# Patient Record
Sex: FEMALE | Race: WHITE | NOT HISPANIC OR LATINO
[De-identification: names, ages, dates, MRNs, and addresses within clinical notes are randomized per-mention and may not be internally consistent; named-entity substitution may affect disease eponyms.]

---

## 2017-03-30 ENCOUNTER — RECORDS - HEALTHEAST (OUTPATIENT)
Dept: ADMINISTRATIVE | Facility: OTHER | Age: 37
End: 2017-03-30
Payer: COMMERCIAL

## 2017-08-01 ENCOUNTER — OFFICE VISIT - HEALTHEAST (OUTPATIENT)
Dept: FAMILY MEDICINE | Facility: CLINIC | Age: 37
End: 2017-08-01

## 2017-08-01 DIAGNOSIS — J32.9 SINUSITIS: ICD-10-CM

## 2017-08-14 ENCOUNTER — COMMUNICATION - HEALTHEAST (OUTPATIENT)
Dept: FAMILY MEDICINE | Facility: CLINIC | Age: 37
End: 2017-08-14

## 2017-08-14 ENCOUNTER — AMBULATORY - HEALTHEAST (OUTPATIENT)
Dept: FAMILY MEDICINE | Facility: CLINIC | Age: 37
End: 2017-08-14

## 2017-08-14 DIAGNOSIS — J32.9 SINUSITIS: ICD-10-CM

## 2017-10-25 ENCOUNTER — OFFICE VISIT - HEALTHEAST (OUTPATIENT)
Dept: PODIATRY | Facility: CLINIC | Age: 37
End: 2017-10-25

## 2017-10-25 DIAGNOSIS — M72.2 PLANTAR FASCIITIS: ICD-10-CM

## 2017-10-26 ENCOUNTER — COMMUNICATION - HEALTHEAST (OUTPATIENT)
Dept: OTHER | Facility: CLINIC | Age: 37
End: 2017-10-26

## 2017-10-31 ENCOUNTER — COMMUNICATION - HEALTHEAST (OUTPATIENT)
Dept: OTHER | Facility: CLINIC | Age: 37
End: 2017-10-31

## 2017-11-05 ENCOUNTER — COMMUNICATION - HEALTHEAST (OUTPATIENT)
Dept: SCHEDULING | Facility: CLINIC | Age: 37
End: 2017-11-05

## 2017-11-07 ENCOUNTER — COMMUNICATION - HEALTHEAST (OUTPATIENT)
Dept: ADMINISTRATIVE | Facility: CLINIC | Age: 37
End: 2017-11-07

## 2017-11-16 ENCOUNTER — RECORDS - HEALTHEAST (OUTPATIENT)
Dept: ADMINISTRATIVE | Facility: OTHER | Age: 37
End: 2017-11-16

## 2017-11-16 ENCOUNTER — AMBULATORY - HEALTHEAST (OUTPATIENT)
Dept: PODIATRY | Facility: CLINIC | Age: 37
End: 2017-11-16

## 2017-11-16 DIAGNOSIS — L60.0 INGROWN TOENAIL: ICD-10-CM

## 2017-12-09 ENCOUNTER — COMMUNICATION - HEALTHEAST (OUTPATIENT)
Dept: FAMILY MEDICINE | Facility: CLINIC | Age: 37
End: 2017-12-09

## 2018-01-28 ENCOUNTER — COMMUNICATION - HEALTHEAST (OUTPATIENT)
Dept: FAMILY MEDICINE | Facility: CLINIC | Age: 38
End: 2018-01-28

## 2018-01-28 DIAGNOSIS — N94.6 DYSMENORRHEA: ICD-10-CM

## 2018-03-14 ENCOUNTER — OFFICE VISIT - HEALTHEAST (OUTPATIENT)
Dept: FAMILY MEDICINE | Facility: CLINIC | Age: 38
End: 2018-03-14

## 2018-03-14 DIAGNOSIS — E66.9 OBESITY (BMI 30-39.9): ICD-10-CM

## 2018-03-14 DIAGNOSIS — M72.2 PLANTAR FASCIITIS, RIGHT: ICD-10-CM

## 2018-03-14 DIAGNOSIS — N94.6 DYSMENORRHEA: ICD-10-CM

## 2018-03-14 DIAGNOSIS — J98.8 WHEEZING-ASSOCIATED RESPIRATORY INFECTION: ICD-10-CM

## 2018-03-14 DIAGNOSIS — J30.89 ENVIRONMENTAL AND SEASONAL ALLERGIES: ICD-10-CM

## 2018-03-14 DIAGNOSIS — Z00.00 HEALTH CARE MAINTENANCE: ICD-10-CM

## 2018-03-14 DIAGNOSIS — G43.829 MENSTRUAL MIGRAINE: ICD-10-CM

## 2018-03-14 ASSESSMENT — MIFFLIN-ST. JEOR: SCORE: 1556.24

## 2018-03-28 ENCOUNTER — OFFICE VISIT - HEALTHEAST (OUTPATIENT)
Dept: PHYSICAL THERAPY | Facility: REHABILITATION | Age: 38
End: 2018-03-28

## 2018-03-28 DIAGNOSIS — M72.2 PLANTAR FASCIITIS: ICD-10-CM

## 2018-03-28 DIAGNOSIS — M79.671 ARCH PAIN OF RIGHT FOOT: ICD-10-CM

## 2018-04-04 ENCOUNTER — OFFICE VISIT - HEALTHEAST (OUTPATIENT)
Dept: PHYSICAL THERAPY | Facility: REHABILITATION | Age: 38
End: 2018-04-04

## 2018-04-04 DIAGNOSIS — M72.2 PLANTAR FASCIITIS: ICD-10-CM

## 2018-04-04 DIAGNOSIS — M79.671 ARCH PAIN OF RIGHT FOOT: ICD-10-CM

## 2018-04-11 ENCOUNTER — OFFICE VISIT - HEALTHEAST (OUTPATIENT)
Dept: PHYSICAL THERAPY | Facility: REHABILITATION | Age: 38
End: 2018-04-11

## 2018-04-11 DIAGNOSIS — M79.671 ARCH PAIN OF RIGHT FOOT: ICD-10-CM

## 2018-04-11 DIAGNOSIS — M72.2 PLANTAR FASCIITIS: ICD-10-CM

## 2018-04-25 ENCOUNTER — OFFICE VISIT - HEALTHEAST (OUTPATIENT)
Dept: PHYSICAL THERAPY | Facility: REHABILITATION | Age: 38
End: 2018-04-25

## 2018-04-25 DIAGNOSIS — M79.671 ARCH PAIN OF RIGHT FOOT: ICD-10-CM

## 2018-04-25 DIAGNOSIS — M72.2 PLANTAR FASCIITIS: ICD-10-CM

## 2018-05-02 ENCOUNTER — OFFICE VISIT - HEALTHEAST (OUTPATIENT)
Dept: PHYSICAL THERAPY | Facility: REHABILITATION | Age: 38
End: 2018-05-02

## 2018-05-02 DIAGNOSIS — M79.671 ARCH PAIN OF RIGHT FOOT: ICD-10-CM

## 2018-05-02 DIAGNOSIS — M72.2 PLANTAR FASCIITIS: ICD-10-CM

## 2018-09-24 ENCOUNTER — OFFICE VISIT - HEALTHEAST (OUTPATIENT)
Dept: FAMILY MEDICINE | Facility: CLINIC | Age: 38
End: 2018-09-24

## 2018-09-24 DIAGNOSIS — J06.9 VIRAL URI WITH COUGH: ICD-10-CM

## 2018-09-24 DIAGNOSIS — R06.2 WHEEZING: ICD-10-CM

## 2018-09-24 DIAGNOSIS — J30.89 ENVIRONMENTAL AND SEASONAL ALLERGIES: ICD-10-CM

## 2018-09-24 DIAGNOSIS — J34.89 SINUS PRESSURE: ICD-10-CM

## 2018-09-27 ENCOUNTER — COMMUNICATION - HEALTHEAST (OUTPATIENT)
Dept: FAMILY MEDICINE | Facility: CLINIC | Age: 38
End: 2018-09-27

## 2019-01-04 ENCOUNTER — OFFICE VISIT - HEALTHEAST (OUTPATIENT)
Dept: FAMILY MEDICINE | Facility: CLINIC | Age: 39
End: 2019-01-04

## 2019-01-04 DIAGNOSIS — L25.9 CONTACT DERMATITIS AND ECZEMA: ICD-10-CM

## 2019-03-11 ENCOUNTER — COMMUNICATION - HEALTHEAST (OUTPATIENT)
Dept: FAMILY MEDICINE | Facility: CLINIC | Age: 39
End: 2019-03-11

## 2019-03-20 ENCOUNTER — COMMUNICATION - HEALTHEAST (OUTPATIENT)
Dept: FAMILY MEDICINE | Facility: CLINIC | Age: 39
End: 2019-03-20

## 2019-03-20 DIAGNOSIS — N94.6 DYSMENORRHEA: ICD-10-CM

## 2019-11-08 ENCOUNTER — COMMUNICATION - HEALTHEAST (OUTPATIENT)
Dept: FAMILY MEDICINE | Facility: CLINIC | Age: 39
End: 2019-11-08

## 2019-11-08 DIAGNOSIS — N94.6 DYSMENORRHEA: ICD-10-CM

## 2020-10-15 ENCOUNTER — RECORDS - HEALTHEAST (OUTPATIENT)
Dept: ADMINISTRATIVE | Facility: OTHER | Age: 40
End: 2020-10-15

## 2021-05-25 ENCOUNTER — RECORDS - HEALTHEAST (OUTPATIENT)
Dept: ADMINISTRATIVE | Facility: CLINIC | Age: 41
End: 2021-05-25

## 2021-05-29 ENCOUNTER — RECORDS - HEALTHEAST (OUTPATIENT)
Dept: ADMINISTRATIVE | Facility: CLINIC | Age: 41
End: 2021-05-29

## 2021-05-30 ENCOUNTER — RECORDS - HEALTHEAST (OUTPATIENT)
Dept: ADMINISTRATIVE | Facility: CLINIC | Age: 41
End: 2021-05-30

## 2021-05-31 VITALS — WEIGHT: 186.6 LBS | BODY MASS INDEX: 32.03 KG/M2

## 2021-06-01 VITALS — BODY MASS INDEX: 32.52 KG/M2 | WEIGHT: 195.19 LBS | HEIGHT: 65 IN

## 2021-06-02 ENCOUNTER — RECORDS - HEALTHEAST (OUTPATIENT)
Dept: ADMINISTRATIVE | Facility: CLINIC | Age: 41
End: 2021-06-02

## 2021-06-02 VITALS — WEIGHT: 195 LBS | BODY MASS INDEX: 32.45 KG/M2

## 2021-06-02 VITALS — WEIGHT: 197.7 LBS | BODY MASS INDEX: 32.9 KG/M2

## 2021-06-03 ENCOUNTER — RECORDS - HEALTHEAST (OUTPATIENT)
Dept: ADMINISTRATIVE | Facility: CLINIC | Age: 41
End: 2021-06-03

## 2021-06-03 NOTE — TELEPHONE ENCOUNTER
Refill Approved    Rx renewed per Medication Renewal Policy. Medication was last renewed on 3/21/19.    Zarina Bansal, [facility] Triage/Med Refill 11/8/2019     Requested Prescriptions   Pending Prescriptions Disp Refills     norgestimate-ethinyl estradiol (SPRINTEC, 28,) 0.25-35 mg-mcg per tablet 90 tablet 2     Sig: TAKE 1 TABLET BY MOUTH DAILY (TAKES CONTINUOUSLY)       Oral Contraceptives Protocol Passed - 11/8/2019  8:16 AM        Passed - Visit with PCP or prescribing provider visit in last 12 months      Last office visit with prescriber/PCP: 1/4/2019 Natalya Beasley MD OR same dept: 1/4/2019 Natalya Beasley MD OR same specialty: 1/4/2019 Natalya Beasley MD  Last physical: Visit date not found Last MTM visit: Visit date not found   Next visit within 3 mo: Visit date not found  Next physical within 3 mo: Visit date not found  Prescriber OR PCP: Natalya Beasley MD  Last diagnosis associated with med order: 1. Dysmenorrhea  - norgestimate-ethinyl estradiol (SPRINTEC, 28,) 0.25-35 mg-mcg per tablet; TAKE 1 TABLET BY MOUTH DAILY (TAKES CONTINUOUSLY)  Dispense: 90 tablet; Refill: 2    If protocol passes may refill for 12 months if within 3 months of last provider visit (or a total of 15 months).

## 2021-06-12 NOTE — PROGRESS NOTES
DIAGNOSIS:  1. Sinusitis  azithromycin (ZITHROMAX Z-YOHAN) 250 MG tablet       PLAN:  Patient Instructions   Increase flonase to 2 sprays once daily in each nostril    zithromax 250 mg.   Two on day #1 and then one daily for 4 days.      HPI: onset watery eyes about a week ago.  Cough onset about4 days ago.  Some post-tussive vomiting.  Last evening sinus pressure started b/l, frontal headache. Some greenish drainage.  Some sob and increased use of inhaler.  No fever.  Taking flonase one spray daily.  Last used albuterol last night.             Current Outpatient Prescriptions on File Prior to Visit   Medication Sig Dispense Refill     acetaminophen (TYLENOL) 500 MG tablet Take 1,000 mg by mouth every 6 (six) hours as needed for pain.       albuterol (PROVENTIL HFA;VENTOLIN HFA) 90 mcg/actuation inhaler Inhale 2 puffs every 6 (six) hours as needed for wheezing. 18 g 1     ibuprofen (ADVIL,MOTRIN) 200 MG tablet Take 600 mg by mouth every 6 (six) hours as needed for pain.       loratadine (CLARITIN) 10 mg tablet Take 10 mg by mouth daily.       MYZILRA 50-30 (6)/75-40 (5)/125-30(10) per tablet TAKE 1 TABLET BY MOUTH DAILY. 84 tablet 0     norgestimate-ethinyl estradiol (MONONESSA, 28,) 0.25-35 mg-mcg per tablet Take 1 tablet by mouth daily. 3 Package 4     No current facility-administered medications on file prior to visit.        Pmh: reviewed  Psh: reviewed  Allergy:  reviewed      EXAM:    /60 (Patient Site: Right Arm, Patient Position: Sitting, Cuff Size: Adult Large)  Pulse 80  Temp 97.9  F (36.6  C) (Oral)   Resp 20  Wt 186 lb 9.6 oz (84.6 kg)  SpO2 99%  BMI 32.03 kg/m2  GEN:   ALERT, NAD, ORIENTED TIMES THREE  HEENT: TMS NL, PERRL, THR CLEAR. Diffuse b/l frontal and maxillary sinus tx to percussion  NECK: SUPPLE WITHOUT ADENOPATHY OR THYROMEGALY  LUNGS: CTA  COR: RRR WITHOUT MURMUR  SKIN: NO RASH    No results found for this or any previous visit (from the past 168 hour(s)).

## 2021-06-13 NOTE — PROGRESS NOTES
Subjective: The patient presented to the clinic today complaining of pain in the bottom the right heel.  The patient indicated that she has had this heel pain for approximately 4 months.  She describes it as an aching pain.  He stated that the pain is worse in the morning when she first gets out of bed.  Prolonged weightbearing and ambulation also aggravate her right heel.  The patient also complained of a painful ingrown toenail involving the inside of her left great toe.    Objective findings: Nails bilateral feet are normal length and color.  The medial border of the left great toenail is severely incurvated.  There is pain on palpation along the medial margin of the left great toenail.  Skin bilaterally warm supple and intact.  DP PT pulses +2/4 bilateral feet.  Capillary refill less than 2 seconds bilateral feet.  Negative clonus, negative Babinski bilateral feet.  Range of motion within normal limits bilateral feet.  Muscle power +5/5 bilaterally in all compartments.  There is severe pain on palpation of the plantar medial aspect of the right heel.    Assessment: Plantar fasciitis right foot, onychocryptosis left great toe    Plan: The patient was given a cortisone injection in the right heel today consisting of 2 cc of dexamethasone sodium phosphate and 1 cc of 2% lidocaine plain.  I have also recommended orthotics.  The patient is currently taking over-the-counter anti-inflammatory medication.

## 2021-06-14 NOTE — PROGRESS NOTES
Subjective findings: The patient return to the clinic today for partial nail excision and matricectomy of the medial border of the left great toenail.  She was previously diagnosed with onychocryptosis.    Description: The patient was taken to the treatment room and placed on the treatment table.  Under local anesthesia of 2% lidocaine plain the left foot was prepped and draped in usual manner.  Following the application of a digital tourniquet to the left great toe the following procedure was performed.  Attention was directed to the medial border of the left great toenail where utilizing an English anvil nail splitter the nail was split from distal to proximal to the level of the epionychium.  The medial border of the left great toenail was then removed.  Next, 3, 30 second applications of phenol were applied to the matrix.  The wound was then flushed with copious amounts of alcohol.  A sterile dressing was applied comprising of bacitracin, Telfa, 2 inch Lucinda and tube gauze.  The tourniquet was removed and normal color return to the left great toe.  The patient appeared to tolerate the procedure and anesthesia well and was discharged in good condition.  She was given both written and verbal postoperative instructions.

## 2021-06-16 PROBLEM — E66.9 OBESITY (BMI 30-39.9): Status: ACTIVE | Noted: 2018-03-14

## 2021-06-16 NOTE — PROGRESS NOTES
Assessment/Plan:    1. Health care maintenance  Immunizations reviewed and utd--had seasonal flu shot at work  Pap reviewed normal with negative HPV 2016, repeat 2021 based on risk factors.  Routine Dental and Eye care recommended  Discussed importance of regular exercise and appropriate calcium intake  Patient defers labs that she is nonfasting.  Plan to recheck lipids and glucose in a year.  Would also consider thyroid given family history and weight issues.  BMI reviewed with patient today consistent with obesity.  Discussed ways to increase activity/more regular exercise.    2. Dysmenorrhea  Nicely regulated with oral contraceptive.  Patient will continue that for now--she is considering discontinuation once her  has vasectomy later this year.  - norgestimate-ethinyl estradiol (SPRINTEC, 28,) 0.25-35 mg-mcg per tablet; TAKE 1 TABLET BY MOUTH DAILY (TAKES CONTINUOUSLY)  Dispense: 84 tablet; Refill: 3    3. Wheezing-associated respiratory infection  Okay refill of inhaler for as needed use with URI.  - albuterol (PROAIR HFA;PROVENTIL HFA;VENTOLIN HFA) 90 mcg/actuation inhaler; Inhale 2 puffs every 6 (six) hours as needed for wheezing.  Dispense: 18 g; Refill: 1    4. Menstrual migraine  Regulated with NSAID and hydration.  Patient will monitor and follow-up with any changes especially if notes issues off oral contraceptive.    5. Environmental and seasonal allergies  Use of Zyrtec daily.    6. Plantar fasciitis, right  Ongoing issues despite use of heel cups, consult with podiatry and injection.  Patient interested in pursuing further conservative measures and will be sent to physical therapy.  - Ambulatory referral to Physical Therapy    Pt states an understanding and agrees with the above plan.    The following high BMI interventions were performed this visit: encouragement to exercise and weight monitoring            Health Maintenance   Topic Date Due     INFLUENZA VACCINE RULE BASED (1) 08/01/2017      "ADVANCE DIRECTIVES DISCUSSED WITH PATIENT  06/27/2021     PAP SMEAR  11/28/2021     TD 18+ HE  08/13/2022     TDAP ADULT ONE TIME DOSE  Completed         HPI    Patient is a 38 y.o. female presents for a physical exam.  Patient is not fasting.  Last cholesterol and glucose couple years ago.  She did have gestational diabetes so does need intermittent screening for diabetes.  Also noted his 20 pound weight gain over the last couple years.  Patient attributes this to significant decrease in activity related to job/family demands as well as, more recently, issues with exercise rotations related to heel pain.  Patient is seen podiatry in been diagnosed with plantar fasciitis.  She is using heel cups in her shoes and is attempted to do some stretching without significant relief.  We did do a trial of injection which she states \"only helped for a few days\".  Symptoms are only with the right heel but is been present now for several months.  She is consistent with wearing shoes but has significant pain when she is barefoot and when she first gets up in the morning.  Arleen has a history of allergies and some wheezing when she gets upper respiratory infections.  She is consistent with using her daily Zyrtec but is requesting refill of her albuterol inhaler which she uses on occasion when she is sick.  Patient remains sexually active.  She is on oral contraceptive for birth control and regulation of her cycles.  This is working well and she would like a refill.  She is planning to discontinue birth control and her  gets a vasectomy this year.  She denies any dyspareunia, discharge, spotting between cycles, or other concerns.  She reports normal bowel and bladder function.  Patient has had history of type I herpes simplex.  No recent outbreaks.  She states her  will periodically get outbreaks under stress but she does not.    The following portions of the patient's history were reviewed and updated as " appropriate: allergies, current medications, past family history, past medical history, past social history, past surgical history and problem list.    Review of Systems  Pertinent items are noted in HPI.  A 12 point comprehensive review of systems was negative except as noted.    Immunization History   Administered Date(s) Administered     DT (pediatric) 01/01/1996, 01/01/1998     DTP 1981, 1981, 1981, 08/23/1982, 06/04/1986     Hep A, historic 11/16/2007, 06/17/2008     Hep B, historic 07/28/1995, 09/01/1995, 02/06/1996     IPV 1981, 1981, 1981, 08/23/1982, 06/04/1986     Influenza, Seasonal, Inj PF IIV3 11/07/2012     Influenza, inj, historic,unspecified 11/20/2017     Influenza, seasonal,quad inj 6-35 mos 11/02/2011     MMR 05/21/1982, 01/01/1993     Meningococcal MCV4P 06/01/2001     Td,adult,historic,unspecified 06/14/2007     Tdap 08/13/2012     No results found for this or any previous visit (from the past 240 hour(s)).    Patient Active Problem List   Diagnosis     Menstrual migraine     Dysmenorrhea     Herpes Simplex Type I     History of gestational diabetes     Environmental and seasonal allergies     Family History   Problem Relation Age of Onset     Diabetes Maternal Grandfather      Hyperlipidemia Paternal Grandfather      Hypertension Mother      Cancer Mother      breast cancer     Hypothyroidism Mother      Hypertension Father      Depression Father      Asthma Father      Depression Sister      Dementia Maternal Grandmother      Social History     Social History     Marital status:      Spouse name: N/A     Number of children: N/A     Years of education: N/A     Occupational History     Not on file.     Social History Main Topics     Smoking status: Never Smoker     Smokeless tobacco: Never Used     Alcohol use No     Drug use: No     Sexual activity: Yes     Partners: Male     Birth control/ protection: OCP     Other Topics Concern     Not on file  "    Social History Narrative       Objective:    /66 (Patient Site: Right Arm, Patient Position: Sitting, Cuff Size: Adult Large)  Pulse 88  Temp 98.7  F (37.1  C) (Oral)   Resp 16  Ht 5' 5\" (1.651 m)  Wt 195 lb 3 oz (88.5 kg)  LMP 02/09/2018  BMI 32.48 kg/m2      General Appearance:    Alert, cooperative, no distress, appears stated age   Head:    Normocephalic, without obvious abnormality, atraumatic   Eyes:    PERRL, conjunctiva/corneas clear, EOM's intact both eyes   Ears:    Normal TM's and external ear canals, both ears   Nose:   Nares normal, septum midline, mucosa normal   Throat:   Lips, mucosa, and tongue normal; teeth and gums normal   Neck:   Supple, symmetrical, trachea midline, no adenopathy;     thyroid:  no enlargement/tenderness/nodules; no carotid    bruit or JVD   Back:     Symmetric, no curvature, no CVA tenderness   Lungs:     Clear to auscultation bilaterally, respirations unlabored   Chest Wall:    No tenderness or deformity    Heart:    Regular rate and rhythm, S1 and S2 normal, no murmur, rub   or gallop   Breast Exam:    No tenderness, masses, or nipple abnormality   Abdomen:     Soft, non-tender, bowel sounds active all four quadrants,     no masses, no organomegaly   Genitalia:   Normal external female genitalia with no evidence for irritation or discharge at the introitus.   Rectal:   Normal external rectal tissue.   Extremities:   Extremities normal, atraumatic, no cyanosis or edema   Pulses:   2+ and symmetric all extremities   Skin:   Skin color, texture, turgor normal, no rashes or lesions       Neurologic:   CNII-XII intact            "

## 2021-06-17 NOTE — PROGRESS NOTES
[facility] Daily Progress     Patient Name: Marta Luis  Date: 4/11/2018  Visit #: 3/12  Referral Diagnosis: Plantar fasciitis, right  Referring provider: Lesley Gonsales MD  Visit Diagnosis:     ICD-10-CM    1. Arch pain of right foot M79.671    2. Plantar fasciitis M72.2        Assessment:     Patient is a 38 y.o. female that presents with signs and symptoms consistent with R arch pain in foot secondary to plantar fasciitis and tightness of her RLE. Patient demonstrates impairments including decreased strength of everters and PFs with increased pain, and TTP of plantar fascia at origin near heel leading to impaired functional mobility. Patient's functional limitations include taking the first step in the morning, standing or walking for longer than 5 minutes, and walking barefoot without increased pain.    Today patient reports she was having a few good days and then her pain came back. She finds benefit from the tape and exercises, she may benefit from orthotics in the future.     HEP/POC compliance is  good .  Patient demonstrates understanding/independence with home program.  Patient is appropriate to continue with skilled physical therapy intervention, as indicated by initial plan of care.    Goal Status:  Pt. will be independent with home exercise program in : 4 weeks  Pt. will be able to walk : 10 minutes;on even surfaces;with no pain;with less difficulty;for household mobility;for community mobility;for work;for exercise/recreation;in 6 weeks  Patient will stand : 30 minutes;with no pain;with less difficultty;for work;in 6 weeks  Patient will return to: sport;exercise;work;for 1 hour;in 6 weeks  Patient will increase : LEFS score;for improved quality of function;for improved quality of life;in 6 weeks  Pt will: report minimal pain upon first step in the morning by 6 weeks.      Plan / Patient Education:     Continue with initial plan of care.  Progress with home program as  "tolerated.    Plan for next visit: review HEP, SL deadlifts, RLE balance, kinesiotape assessment, calf raises, glute strengthening    Subjective:     Pain Ratin  Overall feeling a little bit better, she is noticing she hasn't needed to take medication much this week. She does notice that when she doesn't stretch, she feels more pain. The tape last time didn't really stay, mostly because there was 3 strips.     Functional limitations are described as occurring with:   performing routine daily activities  standing for about 30 minutes   walking for about 10 minutes  putting any pressure on the foot    Objective:       Treatment Today       Patient Education: Patient was educated on continuing plan of care, progress and review of current HEP. Patient educated on importance of consistency with exercise and therapy, as well as activity modification in order to see change and improvements. Patient demonstrated and verbalized understanding.     Manual Therapy:  Ankle mobilizations to increase joint mobility, posterior glides for increased DF  Manual stretch to R plantar fascia     Exercises:  Exercise #1: arch raises in standing - hold 5 sec x 10  Comment #1: standing gastroc/soleus stretch - hold 30 sec x 2  Exercise #2: or seated gastroc/soleus stretch - hold 30 sec x 2  Comment #2: seated plantar fascia stretch - hold 10 sec x 5  Exercise #3: standing plantar fascia stretch at step - hold 20 sec x 3  Comment #3: seated hamstring stretch - hold 30 sec x 2  Exercise #4: ankle theraband 4 way      TREATMENT MINUTES COMMENTS   Evaluation     Self-care/ Home management     Manual therapy 20 Ankle mobilizations to increase joint mobility, posterior glides for increased DF  Manual stretch to R plantar fascia  kinesiotape to R ankle for stability and muscle imbalance - patient in supine with PF/INV: \"I\" strip origin medial malleolus around bottom of foot/calcaneus up along lateral malleolus to insert at TA muscle belly, " "patient in prone with foot in DF: \"I\" strip origin middle calcaneus along achilles tendon up gastroc belly and \"I to Y\" strip origin plantar met heads along achilles tendon with each \"Y\" strip following along soleus muscle belly; 50-75% stretch     Neuromuscular Re-education     Therapeutic Activity     Therapeutic Exercises 10 See above flowsheet; upright bike 4 min   Gait training     Modality__________________                Total 30    Blank areas are intentional and mean the treatment did not include these items.       Jennifer Francis, PT  4/11/2018    "

## 2021-06-17 NOTE — PROGRESS NOTES
[facility] Daily Progress     Patient Name: Marta Luis  Date: 4/25/2018  Visit #: 4/12  Referral Diagnosis: Plantar fasciitis, right  Referring provider: Lesley Gonsales MD  Visit Diagnosis:     ICD-10-CM    1. Arch pain of right foot M79.671    2. Plantar fasciitis M72.2        Assessment:     Patient is a 38 y.o. female that presents with signs and symptoms consistent with R arch pain in foot secondary to plantar fasciitis and tightness of her RLE. Patient demonstrates impairments including decreased strength of everters and PFs with increased pain, and TTP of plantar fascia at origin near heel leading to impaired functional mobility. Patient's functional limitations include taking the first step in the morning, standing or walking for longer than 5 minutes, and walking barefoot without increased pain.    Today patient reports she is feeling pretty good, decreased pain however she is sore from walking too much. She finds benefit from the stretches. Will trial one more session then independence.     HEP/POC compliance is  good .  Patient demonstrates understanding/independence with home program.  Patient is appropriate to continue with skilled physical therapy intervention, as indicated by initial plan of care.    Goal Status:  Pt. will be independent with home exercise program in : 4 weeks  Pt. will be able to walk : 10 minutes;on even surfaces;with no pain;with less difficulty;for household mobility;for community mobility;for work;for exercise/recreation;in 6 weeks  Patient will stand : 30 minutes;with no pain;with less difficultty;for work;in 6 weeks  Patient will return to: sport;exercise;work;for 1 hour;in 6 weeks  Patient will increase : LEFS score;for improved quality of function;for improved quality of life;in 6 weeks  Pt will: report minimal pain upon first step in the morning by 6 weeks.      Plan / Patient Education:     Continue with initial plan of care.  Progress with home program as  tolerated.    Plan for next visit: review HEP, SL deadlifts, RLE balance, kinesiotape assessment, calf raises, glute strengthening    Subjective:     Pain Rating: 3 this morning  Her ankle was starting to feel better and then [place] about 5 hours, it feels pretty sore. She had the pain to 1/10 with doing the stretches and doing the calf stretches.     Functional limitations are described as occurring with:   performing routine daily activities  standing for about 30 minutes   walking for about 10 minutes  putting any pressure on the foot    Objective:       Treatment Today       Patient Education: Patient was educated on continuing plan of care, progress and review of current HEP. Patient educated on importance of consistency with exercise and therapy, as well as activity modification in order to see change and improvements. Patient demonstrated and verbalized understanding.     Manual Therapy:  Ankle mobilizations to increase joint mobility, posterior glides for increased DF  Manual stretch to R plantar fascia     Exercises:  Exercise #1: arch raises in standing - hold 5 sec x 10  Comment #1: standing gastroc/soleus stretch - hold 30 sec x 2  Exercise #2: or seated gastroc/soleus stretch - hold 30 sec x 2  Comment #2: seated plantar fascia stretch - hold 10 sec x 5  Exercise #3: standing plantar fascia stretch at step - hold 20 sec x 3  Comment #3: seated hamstring stretch - hold 30 sec x 2  Exercise #4: ankle theraband 4 way  Comment #4: toe raises and calf raises with eccentric lowring at step - 10 reps       TREATMENT MINUTES COMMENTS   Evaluation     Self-care/ Home management     Manual therapy 10 Ankle mobilizations to increase joint mobility, posterior glides for increased DF  Manual stretch to R plantar fascia     Neuromuscular Re-education 10 SL balance on Airex   Therapeutic Activity     Therapeutic Exercises 10 See above flowsheet; upright bike 4 min   Gait  training     Modality__________________                Total 30    Blank areas are intentional and mean the treatment did not include these items.       Jennifer Francis, PT  4/25/2018

## 2021-06-17 NOTE — PROGRESS NOTES
[facility]   Foot/Ankle Initial Evaluation    Patient Name: Marta Luis  Date of evaluation: 3/28/2018  Referral Diagnosis: Plantar fasciitis, right  Referring provider: Lelsey Gonsales MD  Visit Diagnosis:     ICD-10-CM    1. Arch pain of right foot M79.671    2. Plantar fasciitis M72.2        Assessment:        Patient is a 38 y.o. female that presents with signs and symptoms consistent with R arch pain in foot secondary to plantar fasciitis and tightness of her RLE. Patient demonstrates impairments including decreased strength of everters and PFs with increased pain, and TTP of plantar fascia at origin near heel leading to impaired functional mobility. Patient's functional limitations include taking the first step in the morning, standing or walking for longer than 5 minutes, and walking barefoot without increased pain. Today patient responded well to manual therapy and kinesiotape application.    Patient educated on and demonstrated understanding of nature of impairment, plan of care, patient role and HEP. Patient compliant with PT and prognosis is good. Patient would benefit from skilled PT to progress and improve above impairments.    The POC is dynamic and will be modified on an ongoing basis.  Patient will return to clinic if symptoms persist.  Barriers to achieving goals as noted in the assessment section may affect outcome.  Prognosis to achieve goals is  good   Pt. is appropriate for skilled PT intervention as outlined in the Plan of Care (POC).  Pt. is a good candidate for skilled PT services to improve pain levels and function.    Goals:  Pt. will be independent with home exercise program in : 4 weeks  Pt. will be able to walk : 10 minutes;on even surfaces;with no pain;with less difficulty;for household mobility;for community mobility;for work;for exercise/recreation;in 6 weeks  Patient will stand : 30 minutes;with no pain;with less difficultty;for work;in 6 weeks  Patient will return  to: sport;exercise;work;for 1 hour;in 6 weeks  Patient will increase : LEFS score;for improved quality of function;for improved quality of life;in 6 weeks  Pt will: report minimal pain upon first step in the morning by 6 weeks.    Barriers to Learning or Achieving Goals:  No Barriers.  Chronicity of the problem.    Patient's expectations/goals are realistic.       Plan / Patient Instructions:        Plan of Care:   Communication with: Referral Source  Patient Related Instruction: Nature of Condition;Treatment plan and rationale;Self Care instruction;Basis of treatment;Body mechanics;Posture;Next steps;Expected outcome  Times per Week: 1-2  Number of Weeks: 6  Number of Visits: 12  Therapeutic Exercise: ROM;Stretching;Strengthening  Neuromuscular Reeducation: kinesio tape;posture;balance/proprioception;core;TNE  Manual Therapy: soft tissue mobilization;myofascial release;joint mobilization;muscle energy  Modalities: TENS;iontophoresis  Gait Training: as indicated    POC and pathology of condition were reviewed with patient.  Pt. is in agreement with the Plan of Care  A Home Exercise Program (HEP) was initiated today.  Pt. was instructed in exercises by PT and patient was given a handout with detailed instructions.    Plan for next visit: review HEP, eccentric control at step, RLE balance, kinesiotape assessment, calf raises, glute strengthening     Subjective:         History of Present Illness:    Marta is a 38 y.o. female who presents to therapy today with complaints of R heel pain. Date of onset is October 2017 and onset was gradual. Symptoms are intermittent and getting worse. Patient reports it is worse more in the mornings and at night after working. Hydrocortizone shot did not help, she has spent a ton of money on shoes and inserts and the pain is still there. She denies history of similar symptoms. She describes their previous level of function as not limited. She does have a history of previous R ankle  issues, she had played soccer on it and never got it fixed.     Pain Ratin  Pain rating at best: 3  Pain rating at worst: 8  Pain description: dull and with weight it is a throbbing pain    Functional limitations are described as occurring with:   performing routine daily activities  standing for about 30 minutes   walking for about 10 minutes  putting any pressure on the foot    Patient reports benefit from:  rest  , anti-inflammatory, elevation of the foot     Objective:      Note: Items left blank indicates the item was not performed or not indicated at the time of the evaluation.    Patient Outcome Measures :    Lower Extremity Functional Scale (_/80): 60   Scores range from 0-80, where a score of 80 represents maximum function. The minimal clinically important difference is a positive change of 9 points.    Ankle/Foot Examination  1. Arch pain of right foot     2. Plantar fasciitis       Involved Side: Right  Posture Observation:      General sitting posture is  normal.  Assistive Device: None  Gait Observation: no visible change  Hip Clearing: Does not provoke symptoms  Knee Clearing: Does not provoke symptoms    Foot/Ankle ROM:  WFL and no pain   Date: 3/28/2018     Ankle ROM( ) AROM in degrees AROM in degrees AROM in degrees    Right Left Right Left Right Left   Dorsiflexion, gastroc (10 )         Dorsiflexion, soleus (20 )         Plantar Flexion (50 )         Inversion (45-60 )         Eversion (15-30 )         Great Toe Extension (70 )         Great Toe Flexion (MTP45 , IP90 )          PROM in degrees PROM in degrees PROM in degrees    Right Left Right Left Right Left   Dorsiflexion, gastroc (10 )         Dorsiflexion, soleus (20 )         Plantar Flexion (50 )         Inversion (45-60 )         Eversion (15-30 )         Great Toe Extension (70 )         Great Toe Flexion (MTP45 , IP90 )           Foot/Ankle Strength     Date: 3/28/2018     Ankle/Foot Strength (/5) MMT MMT MMT    Right Left Right Left  "Right Left   Dorsiflexion         Plantarflexion         Inversion         Eversion 4 P        Great Toe Extension           Foot/Ankle Special Tests:     Anterior Drawer: neg  Talar tilt: positive  Andreafski Ankle Rule:   Impingement Sign cluster:   1.ant-lat ankle tenderness   2.ant-lat ankle swelling   3.pain with forced DF and eversion   4.pain with SL squat   5.pain with activities   6.ankle instability    (?5 = +)     Palpation: TTP of medial arch at heel cup and around the cup  LE Tissue Extensibility/Flexibility: decreased of R>L hamstring, gastrocs       Treatment Today       Patient Education: Patient educated on plan of care, prognosis, PT/patient role and HEP. Patient educated on impairments related to condition and reproduction of symptoms. Patient instructed to focus on the small goals and this may be a long process to recovery, and that exercises at home are just as important as coming to therapy. Patient was educated on importance of activity modification and exercise consistency. Patient demonstrated and verbalized understanding.     Manual Therapy:  Ankle mobilizations to increase joint mobility, posterior glides for increased DF  Manual stretch to R plantar fascia     Exercises:  Exercise #1: arch raises in standing - hold 5 sec x 10  Comment #1: standing gastroc/soleus stretch - hold 30 sec x 2  Exercise #2: or seated gastroc/soleus stretch - hold 30 sec x 2  Comment #2: seated plantar fascia stretch - hold 10 sec x 5  Exercise #3: standing plantar fascia stretch at step - hold 20 sec x 3  Comment #3: seated hamstring stretch - hold 30 sec x 2      TREATMENT MINUTES COMMENTS   Evaluation 20    Self-care/ Home management     Manual therapy 15 Ankle mobilizations to increase joint mobility, posterior glides for increased DF  Manual stretch to R plantar fascia   kinesiotape to R ankle for stability and muscle imbalance - patient in supine with PF/INV: \"I\" strip origin lateral malleolus around bottom of " "foot/calcaneus up along medial malleolus to insert at TA muscle belly, patient in prone with foot in DF: \"I\" strip origin middle calcaneus along achilles tendon up gastroc belly  50-75% stretch     Neuromuscular Re-education     Therapeutic Activity     Therapeutic Exercises 10 See flowsheet   Gait training     Modality__________________            45    Total     Blank areas are intentional and mean the treatment did not include these items.       PT Evaluation Code: (Please list factors)  Patient History/Comorbidities: obesity, R foot pain  Examination: decreased joint mobility and TTP with increased pain  Clinical Presentation: stable  Clinical Decision Making: low    Patient History/  Comorbidities Examination  (body structures and functions, activity limitations, and/or participation restrictions) Clinical Presentation Clinical Decision Making (Complexity)   No documented Comorbidities or personal factors 1-2 Elements Stable and/or uncomplicated Low   1-2 documented comorbidities or personal factor 3 Elements Evolving clinical presentation with changing characteristics Moderate   3-4 documented comorbidities or personal factors 4 or more Unstable and unpredictable High            Jennifer Francis, PT  3/28/2018  4:00 PM      "

## 2021-06-17 NOTE — PATIENT INSTRUCTIONS - HE
Patient Instructions by Natalya Beasley MD at 1/4/2019  7:40 AM     Author: Natalya Beasley MD Service: -- Author Type: Physician    Filed: 1/4/2019  9:11 AM Encounter Date: 1/4/2019 Status: Addendum    : Natalya Beasley MD (Physician)    Related Notes: Original Note by Natalya Beasley MD (Physician) filed at 1/4/2019  9:11 AM         Patient Education     Understanding Contact Dermatitis     A cool, moist compress can help reduce itching.     Contact dermatitis is a common type of skin rash. Its caused by something that touches the skin and makes it irritated and inflamed. It can occur on skin on any part of the body, such as the face, neck, hands, arms, and legs. Contact dermatitis is not spread from person to person.  Often, the reaction of contact dermatitis occurs 1 to 2 days after contact with the offending agent.  How to say it  ROSETTE-tact [id number]   What causes contact dermatitis?  Its caused by something that irritates the skin, or that creates an allergic reaction on the skin. People can get contact dermatitis from many kinds of things. These include:    Plant oils in poison ivy, oak, and sumac    Chemicals in household , solvents, and glue    Chemicals in makeup, soap, laundry detergent, perfume, acne cream, and hair products    Certain medicines, such as neomycin, bacitracin, benzocaine, and thimerosal    Metals such as nickel, found in some jewelry and watch bands     The sticky material on the back of bandages and tape (adhesive)    Things that can cause tiny breaks in the skin, such as wood, fiberglass, metal tools, and plant thorns    Rubber latex in surgical gloves and other medical supplies  Dermatitis can also be caused by the skin being damp for long periods of time. This can happen from washing your hands too often, or working with wet materials.  Symptoms of contact dermatitis  Symptoms can include skin that  is:    Blistered    Burning    Cracked    Dry    Itchy    Painful    Red    Rough, thickened, and leathery    Swollen    Warm  The blisters may ooze fluid and form crusts.  Treatment for contact dermatitis  Treatment is done to help relieve itching and reduce inflammation. The rash should go away in a few days to a few weeks. Treatments include:    Cool, moist compress. Use a clean damp cloth. Put it on the area for 20 to 30 minutes, 5 to 6 times a day for the first 3 days.    Steroid cream or ointment. You can apply this medicine several times a day on clean skin.    Oral corticosteroid. Your healthcare provider may prescribe this medicine if you have severe skin symptoms on a large part of your body.  Your healthcare provider may give you a steroid injection instead of pills.    Oral antihistamine. This medicine can help reduce itching.    Colloidal oatmeal bath. Soaking in water with colloidal oatmeal can help soothe skin.    Plain cream, lotion, or ointment. Cream, lotion, or ointment without medicine can help to soothe and protect your skin.  Living with contact dermatitis  Talk with your healthcare provider about what may have caused your contact dermatitis. Patch testing may help you figure out what caused the rash so you can avoid further contact with it. Once you learn what caused your rash, make sure to avoid that substance. If your skin comes into contact with it again, make sure to wash your skin right away. If you cant avoid the substance, wear gloves or other protective clothing before you touch it. Or use a cream, lotion, or ointment to protect your skin.  When to call your healthcare provider  Call your healthcare provider right away if you have any of these:    Fever of 100.4 F (38 C) or higher, or as directed    Symptoms that dont get better, or get worse    New symptoms   Date Last Reviewed: 5/1/2016 2000-2017 [employer]. [address] [place] [address], [place] [address]. All rights  reserved. This information is not intended as a substitute for professional medical care. Always follow your healthcare professional's instructions.

## 2021-06-17 NOTE — PROGRESS NOTES
[facility] Daily Progress     Patient Name: Marta Luis  Date: 4/4/2018  Visit #: 2/12  Referral Diagnosis: Plantar fasciitis, right  Referring provider: Lesley Gonsales MD  Visit Diagnosis:     ICD-10-CM    1. Arch pain of right foot M79.671    2. Plantar fasciitis M72.2        Assessment:     Patient is a 38 y.o. female that presents with signs and symptoms consistent with R arch pain in foot secondary to plantar fasciitis and tightness of her RLE. Patient demonstrates impairments including decreased strength of everters and PFs with increased pain, and TTP of plantar fascia at origin near heel leading to impaired functional mobility. Patient's functional limitations include taking the first step in the morning, standing or walking for longer than 5 minutes, and walking barefoot without increased pain.    Today patient reports she was having a few good days and then her pain came back. She finds benefit from the tape and exercises, she may benefit from orthotics in the future.     HEP/POC compliance is  good .  Patient demonstrates understanding/independence with home program.  Patient is appropriate to continue with skilled physical therapy intervention, as indicated by initial plan of care.    Goal Status:  Pt. will be independent with home exercise program in : 4 weeks  Pt. will be able to walk : 10 minutes;on even surfaces;with no pain;with less difficulty;for household mobility;for community mobility;for work;for exercise/recreation;in 6 weeks  Patient will stand : 30 minutes;with no pain;with less difficultty;for work;in 6 weeks  Patient will return to: sport;exercise;work;for 1 hour;in 6 weeks  Patient will increase : LEFS score;for improved quality of function;for improved quality of life;in 6 weeks  Pt will: report minimal pain upon first step in the morning by 6 weeks.      Plan / Patient Education:     Continue with initial plan of care.  Progress with home program as tolerated.    Plan  "for next visit: review HEP, eccentric control at step, RLE balance, kinesiotape assessment, calf raises, glute strengthening    Subjective:     Pain Ratin  Patient reports she felt pretty good for about 4-5 days, the tape really felt good and it gave her some support.     Functional limitations are described as occurring with:   performing routine daily activities  standing for about 30 minutes   walking for about 10 minutes  putting any pressure on the foot    Objective:       Treatment Today       Patient Education: Patient was educated on continuing plan of care, progress and review of current HEP. Patient educated on importance of consistency with exercise and therapy, as well as activity modification in order to see change and improvements. Patient demonstrated and verbalized understanding.     Manual Therapy:  Ankle mobilizations to increase joint mobility, posterior glides for increased DF  Manual stretch to R plantar fascia     Exercises:  Exercise #1: arch raises in standing - hold 5 sec x 10  Comment #1: standing gastroc/soleus stretch - hold 30 sec x 2  Exercise #2: or seated gastroc/soleus stretch - hold 30 sec x 2  Comment #2: seated plantar fascia stretch - hold 10 sec x 5  Exercise #3: standing plantar fascia stretch at step - hold 20 sec x 3  Comment #3: seated hamstring stretch - hold 30 sec x 2  Exercise #4: ankle theraband 4 way      TREATMENT MINUTES COMMENTS   Evaluation     Self-care/ Home management     Manual therapy 20 Ankle mobilizations to increase joint mobility, posterior glides for increased DF  Manual stretch to R plantar fascia  kinesiotape to R ankle for stability and muscle imbalance - patient in supine with PF/INV: \"I\" strip origin medial malleolus around bottom of foot/calcaneus up along lateral malleolus to insert at TA muscle belly, patient in prone with foot in DF: \"I\" strip origin middle calcaneus along achilles tendon up gastroc belly and \"I to Y\" strip origin plantar met " "heads along achilles tendon with each \"Y\" strip following along soleus muscle belly; 50-75% stretch     Neuromuscular Re-education     Therapeutic Activity     Therapeutic Exercises 10 See above flowsheet; upright bike 4 min   Gait training     Modality__________________                Total 30    Blank areas are intentional and mean the treatment did not include these items.       Jennifer Francis, PT  4/4/2018      "

## 2021-06-17 NOTE — PROGRESS NOTES
[facility] Discharge Summary  Patient Name: Marta Luis  Date: 6/18/2018  Referral Diagnosis: R plantar fasciitis  Referring provider: Lesley Gonsales MD  Visit Diagnosis:   1. Arch pain of right foot     2. Plantar fasciitis         Goals:  Pt. will be independent with home exercise program in : 4 weeks  Pt. will be able to walk : 10 minutes;on even surfaces;with no pain;with less difficulty;for household mobility;for community mobility;for work;for exercise/recreation;in 6 weeks  Patient will stand : 30 minutes;with no pain;with less difficultty;for work;in 6 weeks  Patient will return to: sport;exercise;work;for 1 hour;in 6 weeks  Patient will increase : LEFS score;for improved quality of function;for improved quality of life;in 6 weeks  Pt will: report minimal pain upon first step in the morning by 6 weeks.    Patient was seen for 5 visits for physical therapy of plantar fasciitis from 3/28/18 to 5/2/18 with no follow up appointments.   The patient met goals and has demonstrated understanding of and independence in the home program for self-care, and progression to next steps.  She will initiate contact if questions or concerns arise.  The patient reports feeling better and did not wish to schedule further therapy at this time.    Therapy will be discontinued at this time.  The patient will need a new referral to resume physical therapy treatment. Please see below for patient's current status.    Thank you for your referral.  Jennifer Francis, PT, DPT  6/18/2018   2:30 PM      [facility] Daily Progress     Patient Name: Marta Luis  Date: 5/2/2018  Visit #: 5/12  Referral Diagnosis: Plantar fasciitis, right  Referring provider: Lesley Gonsales MD  Visit Diagnosis:     ICD-10-CM    1. Arch pain of right foot M79.671    2. Plantar fasciitis M72.2        Assessment:     Patient is a 38 y.o. female that presents with signs and symptoms consistent with R arch pain in foot secondary to  plantar fasciitis and tightness of her RLE. Patient demonstrates impairments including decreased strength of everters and PFs with increased pain, and TTP of plantar fascia at origin near heel leading to impaired functional mobility. Patient's functional limitations include taking the first step in the morning, standing or walking for longer than 5 minutes, and walking barefoot without increased pain.    Today patient reports she is feeling pretty good, decreased pain however she is sore from running, the first time she ran in awhile. She finds benefit from the stretches. Will trial one more session after 1-2 weeks of independence.     HEP/POC compliance is  good .  Patient demonstrates understanding/independence with home program.  Patient is appropriate to continue with skilled physical therapy intervention, as indicated by initial plan of care.    Goal Status:  Pt. will be independent with home exercise program in : 4 weeks  Pt. will be able to walk : 10 minutes;on even surfaces;with no pain;with less difficulty;for household mobility;for community mobility;for work;for exercise/recreation;in 6 weeks  Patient will stand : 30 minutes;with no pain;with less difficultty;for work;in 6 weeks  Patient will return to: sport;exercise;work;for 1 hour;in 6 weeks  Patient will increase : LEFS score;for improved quality of function;for improved quality of life;in 6 weeks  Pt will: report minimal pain upon first step in the morning by 6 weeks.      Plan / Patient Education:     Continue with initial plan of care.  Progress with home program as tolerated.    Plan for next visit: review HEP, SL deadlifts, RLE balance, kinesiotape assessment, calf raises, glute strengthening    Subjective:     Pain Ratin  A little bit sore today, she ran on it yesterday and she knew she over did it, playing baseball. She notices more irritation in the heel this week, a little improvement but not as much as last week. She has been working  more on the balance exercises. She had pain yesterday when she would stop running, she would feel the pressure on the heel of the R foot.     Functional limitations are described as occurring with:   performing routine daily activities  standing for about 30 minutes   walking for about 10 minutes  putting any pressure on the foot    Objective:       Treatment Today       Patient Education: Patient was educated on continuing plan of care, progress and review of current HEP. Patient educated on importance of consistency with exercise and therapy, as well as activity modification in order to see change and improvements. Patient demonstrated and verbalized understanding.     Manual Therapy:  Ankle mobilizations to increase joint mobility, posterior glides for increased DF  Manual stretch to R plantar fascia     Exercises:  Exercise #1: arch raises in standing - hold 5 sec x 10  Comment #1: standing gastroc/soleus stretch - hold 30 sec x 2  Exercise #2: or seated gastroc/soleus stretch - hold 30 sec x 2  Comment #2: seated plantar fascia stretch - hold 10 sec x 5  Exercise #3: standing plantar fascia stretch at step - hold 20 sec x 3  Comment #3: seated hamstring stretch - hold 30 sec x 2  Exercise #4: ankle theraband 4 way  Comment #4: toe raises and calf raises with eccentric lowring at step - 10 reps   Exercise #5: prone glutes - 10-30 reps  Comment #5: sidelying clamshells - 15 reps      TREATMENT MINUTES COMMENTS   Evaluation     Self-care/ Home management     Manual therapy 15 Ankle mobilizations to increase joint mobility, posterior glides for increased DF  Manual stretch to R plantar fascia  STM to gastroc/soleus   Neuromuscular Re-education 10 SL balance on Airex   Therapeutic Activity     Therapeutic Exercises 4 See above flowsheet; upright bike 4 min   Gait training     Modality__________________                Total 29    Blank areas are intentional and mean the treatment did not include these items.        Jennifer Francis, PT  5/2/2018

## 2021-06-20 NOTE — PROGRESS NOTES
Assessment/plan   Marta Luis is a 38 y.o. female who is New   patient to my practice here with   Chief Complaint   Patient presents with     Cough     associated with nasal congestion and headaches, having to use albuterol a lot more frequently, stared x3 weeks ago, denies any other sx        Marta was seen today for cough.    Diagnoses and all orders for this visit:    Environmental and seasonal allergies    Viral URI with cough    Sinus pressure    Wheezing  -     predniSONE (DELTASONE) 20 MG tablet; Take 1 tablet (20 mg total) by mouth 2 (two) times a day for 5 days.    Other orders  -     loratadine-pseudoephedrine (CLARITIN-D 12 HOUR) 5-120 mg Tb12; Take 1 tablet by mouth 2 (two) times a day.        Subjective:      HPI: Marta Luis is a 38 y.o. female is here for.    Upper Respiratory Infection: Patient complains of symptoms of a URI, and Reactive airway disease ( mild asthma like symptoms) per patient report she got those symptoms every yr with change in season . Symptoms include congestion and chest tightness and nigt time cough and wheezing . Onset of symptoms was 3 week ago, gradually worsening since that time. She also c/o sore throat for the past 1 week .  She is drinking plenty of fluids. Evaluation to date: none. Treatment to date: antihistamines, cough suppressants and nasal steroids.          No past medical history on file.  Past Surgical History:   Procedure Laterality Date     NM APPENDECTOMY      Description: Appendectomy;  Recorded: 06/14/2012;     Amoxicillin; Cefaclor; Nitrofurantoin monohyd/m-cryst; Other environmental allergy; Sulfa (sulfonamide antibiotics); and Sulfasalazine  Current Outpatient Prescriptions   Medication Sig Dispense Refill     albuterol (PROAIR HFA;PROVENTIL HFA;VENTOLIN HFA) 90 mcg/actuation inhaler Inhale 2 puffs every 6 (six) hours as needed for wheezing. 18 g 1     cetirizine (ZYRTEC) 10 MG tablet Take 1 tablet (10 mg total) by mouth daily. 90 tablet 3      ibuprofen (ADVIL,MOTRIN) 200 MG tablet Take 600 mg by mouth every 6 (six) hours as needed for pain.       norgestimate-ethinyl estradiol (SPRINTEC, 28,) 0.25-35 mg-mcg per tablet TAKE 1 TABLET BY MOUTH DAILY (TAKES CONTINUOUSLY) 84 tablet 3     loratadine-pseudoephedrine (CLARITIN-D 12 HOUR) 5-120 mg Tb12 Take 1 tablet by mouth 2 (two) times a day. 30 tablet 0     predniSONE (DELTASONE) 20 MG tablet Take 1 tablet (20 mg total) by mouth 2 (two) times a day for 5 days. 10 tablet 0     No current facility-administered medications for this visit.      Family History   Problem Relation Age of Onset     Diabetes Maternal Grandfather      Hyperlipidemia Paternal Grandfather      Hypertension Mother      Cancer Mother      breast cancer     Hypothyroidism Mother      Hypertension Father      Depression Father      Asthma Father      Depression Sister      Dementia Maternal Grandmother        Patient Active Problem List   Diagnosis     Menstrual migraine     Dysmenorrhea     Herpes Simplex Type I     History of gestational diabetes     Environmental and seasonal allergies     Obesity (BMI 30-39.9)       Review of Systems   12 point comprehensive review of systems was negative except as noted and HPI     Social History     Social History Narrative       Objective:     Vitals:    09/24/18 0832   BP: 100/72   Pulse: 86   SpO2: 99%   Weight: 195 lb (88.5 kg)       Physical Exam:     General: Alert, no acute distress.   HEENT: normocephalic conjunctivae are clear, Normal pearly TMs bilaterally without erythema, pus or fluid. Position and landmarks are normal.  Nose is clear.  Oropharynx is moist and clear, + MILD ERYTHEMA   Neck: supple without adenopathy or thyromegaly.  Lungs: Good aeration bilaterally.   No tachypnea, retractions, or increased work of breathing.  Mild exp wheezes, no  rales or rhonci.    Heart: regular rate and rhythm, normal S1 and S2, no murmurs  Abdomen: soft and nontender, bowel sounds are present, no  hepatosplenomegaly or mass palpable.  Skin: clear without rash or lesions  Neuro: alert, interactive moving all extremities equally, normal muscle tone in all 4 extremities, deep tendon reflexes 2+ symmetrically at the patella      Natalya Beasley MD    Patient Instructions   Dear Marta,    It was a pleasure to see you in clinic today. Should you have any questions or concerns, my assistant is Sara / and care coordinator Makenzie and they  can be reached directly at [phone]    Plan discussed at this visit : how to help you cough  Wheezing   , sinus congestion      Cough and congestion  we recommend Claritin D ( or any other brand or generic) twice daily  with Prednisone 1 tab oral twice daily  For 5 days to help lung inflammation which most of the time caused by virus or allergies and does not require antibiotics treatment but if no symptom improvement in next 48 day please call back and will send prescription for for antibiotics     Sinus congestion and post nasal drip : recommend Flonase( or any other brand or generic) 2 spray each side of the nose at bed time with saline rinse or drops to help clear the congestion and help drain the mucus out     Cough : other thing which can be tried Honey , stephenie tea, vitamin C and saltwater gargle drink plenty of fluids to prevent dehydration and plenty of rest . Avoid smoke exposure .       Feel free to call for any concerns or questions or send us My chart message     Natalya Beasley MD

## 2021-06-22 NOTE — PROGRESS NOTES
Assessment/plan   Marta Luis is a 38 y.o. female who is New  patient to my practice here with   Chief Complaint   Patient presents with     Derm Issue     painful rash x3 weeks, all otc meds not helping        Marta was seen today for derm issue.    Diagnoses and all orders for this visit:    Contact dermatitis and eczema  -     clobetasol (TEMOVATE) 0.05 % ointment; Apply to affected area twice daily  -     predniSONE (DELTASONE) 20 MG tablet; Take 20 mg by mouth 2 (two) times a day for 5 days.      Will do trial of prednisone for 5 days along with local treatment with clobetasol.  Also recommend to use breathable undergarments to prevent sweat accumulation and also use powder or antiperspirant in the area of sweating to prevent further flareup of the rash.  If symptoms do not improve in the next 1 week patient was advised to call back for further instruction and treatment  Subjective:      HPI: Marta Luis is a 38 y.o. female is here for persistent rash which is present for last 3 weeks mostly localized in between the breast creases and under the breast.  Started as a rash which she has most of the time after sweat from exercise.  Since rash started she use over-the-counter hydrocortisone cream Benadryl which not seems to be helping much.  She had changed her sports bra to non-wires cotton which also not helping the rash.  She feel had a rash is very itchy and now feeling more painful also.  No open skin or sores no fever chills sweats rash is pretty much localized no spreading.        No past medical history on file.  Past Surgical History:   Procedure Laterality Date     [place] APPENDECTOMY      Description: Appendectomy;  Recorded: 06/14/2012;     Amoxicillin; Cefaclor; Nitrofurantoin monohyd/m-cryst; Other environmental allergy; Sulfa (sulfonamide antibiotics); and Sulfasalazine  Current Outpatient Medications   Medication Sig Dispense Refill     albuterol (PROAIR HFA;PROVENTIL HFA;VENTOLIN HFA) 90  mcg/actuation inhaler Inhale 2 puffs every 6 (six) hours as needed for wheezing. 18 g 1     ibuprofen (ADVIL,MOTRIN) 200 MG tablet Take 600 mg by mouth every 6 (six) hours as needed for pain.       loratadine (CLARITIN) 10 mg tablet Take by mouth.       norgestimate-ethinyl estradiol (SPRINTEC, 28,) 0.25-35 mg-mcg per tablet TAKE 1 TABLET BY MOUTH DAILY (TAKES CONTINUOUSLY) 84 tablet 3     cetirizine (ZYRTEC) 10 MG tablet Take 1 tablet (10 mg total) by mouth daily. 90 tablet 3     clobetasol (TEMOVATE) 0.05 % ointment Apply to affected area twice daily 30 g 0     loratadine-pseudoephedrine (CLARITIN-D 12 HOUR) 5-120 mg Tb12 Take 1 tablet by mouth 2 (two) times a day. 30 tablet 0     predniSONE (DELTASONE) 20 MG tablet Take 20 mg by mouth 2 (two) times a day for 5 days. 10 tablet 0     No current facility-administered medications for this visit.      Family History   Problem Relation Age of Onset     Diabetes Maternal Grandfather      Hyperlipidemia Paternal Grandfather      Hypertension Mother      Cancer Mother         breast cancer     Hypothyroidism Mother      Hypertension Father      Depression Father      Asthma Father      Depression Sister      Dementia Maternal Grandmother        Patient Active Problem List   Diagnosis     Menstrual migraine     Dysmenorrhea     Herpes Simplex Type I     History of gestational diabetes     Environmental and seasonal allergies     Obesity (BMI 30-39.9)       Review of Systems   12 point comprehensive review of systems was negative except as noted and HPI     Social History     Social History Narrative     Not on file       Objective:     Vitals:    01/04/19 0750   BP: 126/84   Pulse: 75   Weight: 197 lb 11.2 oz (89.7 kg)       Physical Exam:   Physical Exam:  General Appearance:  Appears comfortable, Alert, cooperative, no distress,   SKIN: fine macular rash b/w the breast crease and under the breast                  Lungs: Clear to auscultation bilaterally, respirations  unlabored  Chest Wall: No tenderness or deformity  Heart: Regular rate and rhythm, S1 and S2 normal, no murmur, rubs or gallop  Abdomen: Soft, non-tender, bowel sounds active all four quadrants,   no masses, no organomegaly  Extremities: Extremities normal, atraumatic, no cyanosis or edema  Pulses: DP pulses are 1-2+ bilat.    Skin: no rashes or lesions  Neurologic: normal and equal strength bilat in upper and lower extremities       Natalya Beasley MD    There are no Patient Instructions on file for this visit.

## 2021-06-24 NOTE — TELEPHONE ENCOUNTER
Medication Request  Medication name:  Tamiflu  Pharmacy Name and Location:  [facility]  Reason for request: Pt son was diagnosed with Influenza A yesterday, requesting Tamiflu  When did you use medication last ?: Never    Okay to leave a detailed message: yes

## 2021-06-24 NOTE — TELEPHONE ENCOUNTER
"Called Rubia. Her son was dx with influenza A yesterday and another son clinically dx with this as well and started on tx. Rubia has no current URI sx aside from post nasal drainage \"which happens to me a lot\". No fevers or myalgias or cough. Not pregnant or post partum. No medical comorbidities.  Reviewed CDC indications for chemoprophylaxis as it does not sound like she would even need empiric treatment at this time.  Reviewed she does not need Tamiflu at this point and if she were to develop a cough or fever at that point it would be reasonable to empirically treat her and she could call for a prescription of Tamiflu at that point.  Maria Aguilar MD    "

## 2021-06-25 NOTE — TELEPHONE ENCOUNTER
Refill Approved    Rx renewed per Medication Renewal Policy. Medication was last renewed on 3/14/18.    Zarina Bansal, [facility] Triage/Med Refill 3/21/2019     Requested Prescriptions   Pending Prescriptions Disp Refills     norgestimate-ethinyl estradiol (SPRINTEC, 28,) 0.25-35 mg-mcg per tablet 90 tablet 3     Sig: TAKE 1 TABLET BY MOUTH DAILY (TAKES CONTINUOUSLY)    Oral Contraceptives Protocol Passed - 3/20/2019 12:30 PM       Passed - Visit with PCP or prescribing provider visit in last 12 months     Last office visit with prescriber/PCP: 1/4/2019 Natalya Beasley MD OR same dept: 1/4/2019 Natalya Beasley MD OR same specialty: 1/4/2019 Natalya Beasley MD  Last physical: Visit date not found Last MTM visit: Visit date not found   Next visit within 3 mo: Visit date not found  Next physical within 3 mo: Visit date not found  Prescriber OR PCP: Natalya Beasley MD  Last diagnosis associated with med order: 1. Dysmenorrhea  - norgestimate-ethinyl estradiol (SPRINTEC, 28,) 0.25-35 mg-mcg per tablet; TAKE 1 TABLET BY MOUTH DAILY (TAKES CONTINUOUSLY)  Dispense: 90 tablet; Refill: 3    If protocol passes may refill for 12 months if within 3 months of last provider visit (or a total of 15 months).

## 2021-06-25 NOTE — TELEPHONE ENCOUNTER
Who is calling:  Patient  Reason for Call:  Patient is asking the status of her refill request, relay prescription was sent today. No further questions.  Date of last appointment with primary care: 1/4/19  Okay to leave a detailed message: No call back requested

## 2021-07-03 NOTE — ADDENDUM NOTE
Addendum Note by Edmar Hairston CMA at 11/17/2017  1:01 PM     Author: Edmar Hairston CMA Service: -- Author Type: Medical Assistant    Filed: 11/17/2017  1:01 PM Encounter Date: 11/16/2017 Status: Signed    : Edmar Hairston CMA (Medical Assistant)    Addended by: EDMAR HAIRSTON on: 11/17/2017 01:01 PM        Modules accepted: Orders

## 2021-09-04 ENCOUNTER — HEALTH MAINTENANCE LETTER (OUTPATIENT)
Age: 41
End: 2021-09-04

## 2022-10-22 ENCOUNTER — HEALTH MAINTENANCE LETTER (OUTPATIENT)
Age: 42
End: 2022-10-22

## 2023-11-05 ENCOUNTER — HEALTH MAINTENANCE LETTER (OUTPATIENT)
Age: 43
End: 2023-11-05